# Patient Record
Sex: MALE | Race: WHITE
[De-identification: names, ages, dates, MRNs, and addresses within clinical notes are randomized per-mention and may not be internally consistent; named-entity substitution may affect disease eponyms.]

---

## 2019-07-26 ENCOUNTER — HOSPITAL ENCOUNTER (INPATIENT)
Dept: HOSPITAL 1 - ED | Age: 29
LOS: 2 days | Discharge: HOME | DRG: 755 | End: 2019-07-28
Attending: FAMILY MEDICINE | Admitting: FAMILY MEDICINE
Payer: MEDICAID

## 2019-07-26 VITALS
WEIGHT: 155.06 LBS | HEIGHT: 73 IN | WEIGHT: 155.06 LBS | BODY MASS INDEX: 20.55 KG/M2 | BODY MASS INDEX: 20.55 KG/M2 | HEIGHT: 73 IN

## 2019-07-26 DIAGNOSIS — F41.8: ICD-10-CM

## 2019-07-26 DIAGNOSIS — F84.0: ICD-10-CM

## 2019-07-26 DIAGNOSIS — R45.851: ICD-10-CM

## 2019-07-26 DIAGNOSIS — F12.10: ICD-10-CM

## 2019-07-26 DIAGNOSIS — F25.0: ICD-10-CM

## 2019-07-26 DIAGNOSIS — F43.10: Primary | ICD-10-CM

## 2019-07-26 DIAGNOSIS — Z91.5: ICD-10-CM

## 2019-07-26 DIAGNOSIS — F15.10: ICD-10-CM

## 2019-07-26 LAB
ALBUMIN SERPL-MCNC: 4 G/DL (ref 3.4–5)
ALP SERPL-CCNC: 50 U/L (ref 46–116)
ALT SERPL-CCNC: 16 U/L (ref 16–63)
AMPHETAMINES UR QL SCN: (no result)
AST SERPL-CCNC: 11 U/L (ref 15–37)
BASOPHILS NFR BLD: 0.4 % (ref 0–2)
BILIRUB SERPL-MCNC: 0.79 MG/DL (ref 0.2–1)
BUN SERPL-MCNC: 16 MG/DL (ref 7–18)
CALCIUM SERPL-MCNC: 8.6 MG/DL (ref 8.5–10.1)
CHLORIDE SERPL-SCNC: 107 MMOL/L (ref 98–107)
CO2 SERPL-SCNC: 21.8 MMOL/L (ref 21–32)
CREAT SERPL-MCNC: 0.8 MG/DL (ref 0.7–1.3)
ERYTHROCYTE [DISTWIDTH] IN BLOOD BY AUTOMATED COUNT: 14 % (ref 11.5–14.5)
GFR SERPLBLD BASED ON 1.73 SQ M-ARVRAT: > 60 ML/MIN
GLUCOSE SERPL-MCNC: 101 MG/DL (ref 74–106)
PLATELET # BLD: 257 X10^3MCL (ref 130–400)
POTASSIUM SERPL-SCNC: 4 MMOL/L (ref 3.5–5.1)
PROT SERPL-MCNC: 7.2 G/DL (ref 6.4–8.2)
SODIUM SERPL-SCNC: 142 MMOL/L (ref 136–145)

## 2019-07-26 PROCEDURE — G0378 HOSPITAL OBSERVATION PER HR: HCPCS

## 2019-07-26 PROCEDURE — G0480 DRUG TEST DEF 1-7 CLASSES: HCPCS

## 2019-07-26 NOTE — NUR
PT COMPLAINS OF DYSURIA, DR TABARES NOTIFIED. PT IN BATHROOM TO COLLECT URINE
SPECIMEN. WAIITTJ FOR ORDERS.

## 2019-07-26 NOTE — NUR
PT COMES TO ER BECAUSE HE IS FEELING SUICIDAL AFTER WAKING UP TODAY, HAS NO
SPECIFIC PLAN. PT REPORTS FEELING DEPRESSED LATELY, BECAUSE HIS FATHER  TWO
YEARS AGO. PT AWAKE, ALERT,ORIENTED X3, DENIES ANY PAIN AT THIS TIME, BUT FEELS
ANXIOUS, REQUESTING ATIVAN AND FOOD. RESP EVEN AND UNLABORED,ON RA@99%, DENIES
ANY CP OR SOB. DENIES ANY N/V/F, DOES REPORT SELF MEDICATING WITH MARIJUANA.
SAFETY PRECAUTIONS IN PLACE. CN AWARE OF PT'S STATUS.O SITTER AVAIABLE AT THIS
TIME.PT IN DIRECT NURSING VIEW. WILL CONT TO MONITOR CLOSELY.

## 2019-07-26 NOTE — NUR
PT REPORTS PAIN IMPROVED TO 2/10 S/P TYLENOL ADMINISTRATION. PT IS AWAKE AND
ALERT, RESP E/U. PT PROVIDED WITH JELLO AND CRACKERS PER HIS REQUEST. PT
SITTING UP IN HIGH FOWLERS. PT REMAINS CALM AND COOPERATIVE. ASKED PT IF HE
CURRENTLY HAD ANY SI, STATES "YES." ASKED HIM IF HE HAD A PLAN, PT STATED "I
JUST DON'T WANT TO THINK ABOUT IT."

## 2019-07-26 NOTE — NUR
SPOKE TO PSYCHIATRIST DR. CASEY. 
 AND IS REQUESTING HOSPITILAZATION FOR A 5150 HOLD. ALSO STATES THAT PT IS
WILLING TO GO VOLUNTARILY. CN NOTIFIED, WILL CALL [facility] TO COME AND PUT PT
ON HOLD.

## 2019-07-26 NOTE — NUR
PT LAYING COMFORTABLY IN GURNEY, AWAKE AND ALERT, CALM AND COOPERATIVE, RESP
E/U, REPORTS 10/10 HEADACHE PAIN. MD MADE AWARE. PT CONTINUES TO BE IN DIRECT
SIGHT OF NURSE'S STATION.

## 2019-07-26 NOTE — NUR
PATIENT IS BACK IN BED, HE HAD GONE TO THE BATHROOM. NO COMPLAINT OF PAIN AT
THIS TIME. PATIENT REQUESTED A BLANKET IF THE AMBULANCE IS NOT COMING FOR HIM 
NON. PATIENT WAS GIVEN BLANKET ANC COMFORT ENSURES. PATIENT IS WATCHING TV.

## 2019-07-26 NOTE — NUR
CALLED OVER TO PT ROOM, PT STATED HE HAD TOLD THE PREVIOUS NURSE THAT HE WAS
HAVING A HEADACHE. PT ALSO INFORMED ME THAT IN ORDER TO HELP WITH HIS HEADACHE,
HE WENT TO THE RESTROOM AND "BANGED MY HEAD AGAINST THE WALL AND I DON'T WANT
TO HURT MY SELF ANYMORE". EXPLAINED TO THE PT THAT HE NEEDS TO CALL FOR
ASSISTANCE WHEN HE NEEDS TO USE THE RESTROOM AND THAT HE CANNOT INJURE HIMSELF.
PT GAVE UNDERSTANDING AND AGREED THAT HE WOULD NO LONGER DO ANYTHING TO HURT
HIMSELF WHILE IN THE ED. INFORMED PT THAT TYLENOL HAD BEEN ORDER FOR HIM. THEN
ASKED PT IF HE HAD BEEN PROVIDED WITH DINNER, PT STATED HE DID NOT GET A DINNER
TRAY AND OFFERED HIM SANDWICH. PT AGREED TO SANDWICH AND TYLENOL FOR HEADACHE.
PT A&OX4 AT THIS TIME, STUTTERS IN HIS SPEECH, REMAINS COOPERATIVE AT THIS
TIME, BUT APPEARS UNEASY AND REPOSITIONG OFTEN TO COMFORT. EDUCATED PT TO USE
CALL LIGHT IF HE NEEDED TO USE RESTROOM. PT GAVE UNDERSTANDING.

## 2019-07-26 NOTE — NUR
I WAS CALLED TO THE ED TO WRITE A 5150 HOLD FOR THIS PATIENT.
I WAS TOLD BY THE [facility] THAT THE TELEPSYCH MD HAS SUGGESTED THE HOLD AFTER 
HIS EVALUATION.  THE RN NOTES ALSO STATE SUICIDAT IDEATION, NO PLAN, 5150
ADVISED.
I SPOKE TO THE PTIENT AT THE [facility].  HE TELLS ME HE LIVES AT [address]ESS[address]. 
HIS UNKLE (TRACIE DEL ROSARIO) AND HIS MOTHER, LOVAL TO [facility] [place].
 
HE STATES HE HAS BEEN ON MANY 5150 HOLDS AND UNDERSTANDS THEM.

## 2019-07-27 VITALS — DIASTOLIC BLOOD PRESSURE: 89 MMHG | SYSTOLIC BLOOD PRESSURE: 133 MMHG

## 2019-07-27 LAB
AMYLASE SERPL-CCNC: 56 U/L (ref 25–115)
CHOLEST SERPL-MCNC: 177 MG/DL (ref ?–200)
CHOLEST/HDLC SERPL: 2.9 MG/DL
HDLC SERPL-MCNC: 61 MG/DL (ref 40–60)
MAGNESIUM SERPL-MCNC: 2 MG/DL (ref 1.8–2.4)
TRIGL SERPL-MCNC: 134 MG/DL (ref ?–150)

## 2019-07-27 NOTE — NUR
CALLED TO PT RM, PT STS HE IS FEELING ANXIOUS AND WOULD LIKE SOME MEDICATION
FOR THAT. DR. RICHARDS MADE AWARE. PT CALM/COOPERATIVE, RESP E/U, NO ACUTE DISTRESS
NOTED AT THIS TIME.

## 2019-07-27 NOTE — NUR
PT REQUESTED TOOTH BRUSH AND TOOTH PASTE. PT GIVEN ITEMS AND TOLD HE HAS TO
BRUSH TEETH IN ROOM WITHIN VIEW OF NURSES STATION. PT WAS COOPERATIVE.

## 2019-07-27 NOTE — NUR
PT RESTING IN BED. BREATHING E/U. NO S/S ACUTE DISTRESS. 1:1 SITTER AT BEDSIDE
TO MAINTAIN SAFETY. CALL LIGHT WITHIN REACH. WILL CONTINUE TO MONITOR.

## 2019-07-27 NOTE — NUR
PT UP ASKING NURSES FOR PENCIL AND PAPER. PT WAS TOLD BY ME WE COULD NOT GIVE
HIM A PENCIL TO USE BUT I WILL WRITE SOMETHING DOWN FOR HIM IF HE NEEDED. PT
LAUGHED AND STATED "IT'S OK I DON'T WANT TO OFFEND YOU WITH MY RAP LYRICS". PT
CALM/COOPERATIVE, RESP E/U, AAOX4, NO ACUTE DISTRESS NOTED AT THIS TIME.

## 2019-07-27 NOTE — NUR
RECEIVED PT FROM ED VIA KAI. ORIENTED PT TO ROOM AND SURROUNDINGS. IV
NOTED TO LAC PATENT AND INTACT. INSTRUCTED PT ON THE USE OF CALL LIGHT FOR
ASSISTANCE. ENDORSED PT TO PRIMARY GIANA GALLOWAY

## 2019-07-27 NOTE — NUR
PT WALKED UP TO NURSES STATION AND STATED "I HAVE INFLAMMATION ON MY FACE RIGHT
HERE AND THEY DON'T WANT TO GIVE ME NAPROXEN FOR THE INFLAMMATION AND THAT'S
FUCKED UP. CAN I GET A PAPER FOR COMPLAINT, BECAUSE I'M GOING TO WRITE A
COMPLAINT. I'VE GOTTEN DOCTORS FIRED BEFORE AND THEY'RE NOT GOING TO GIVE ME
NAPROXEN THEN I'M GOING TO COMPLAIN". INFORMED PT THAT WE WOULD TALK TO THE
DOCTOR. THEN PT RETURNED TO ROOM AND BEGAN CLOSING THE CURTAINS, DESPITE MYSELF
AND RN DILIP TELLING THE PATIENT THAT CURTAIN NEEDS TO REMAIN OPEN.  UPON
ENTERING ROOM, INFORMED PT THAT THE CURTAIN MUST STAY OPEN, THEN PT STATED "
I'LL BE COOPERATIVE IF YOU GET ME NAPROXEN".  EXPLAINED TO PT THAT WE ARE NOT
BARGAINING EXPECTED BEHAVIOR FOR MEDICATIONS, THEN PT REPLIED "I'M NOT
BARGAINING! THAT'S WHAT YOU'RE SAYING." EXPLAINED TO PT THAT I WILL TALK TO THE
DOCTOR AND THEN HE STATED "IF I'M NOT GETTING NAPROXEN THEN I'M WALKING OUT OF
HERE AND THE POLICE CAN JEFFERY ME." THEN PT PROCEEDED TO WALK OUT TOWARDS EXIT
DOOR.  MILLS AT PT SIDE, DIRECTED PT TO RETURN TO [facility]. PT
AMBULATED BACK TO [facility].

## 2019-07-27 NOTE — NUR
REPORT RECEIVED FROM DELBERT CHERRY TO ASSUME CARE OF PT. PT AAOX4, CALM/COOPERATIVE,
RESP E/U, AMBULATED TO RESTROOM WITH STEADY GAIT, NO ACUTE DISTRESS NOTED AT
THIS TIME.

## 2019-07-27 NOTE — NUR
PT REEVALUATED FOR SI. PT STS HE NO LONGER WANTS TO HARM HIMSELF. PT STATED "I
WANT TO GET HELP MANAGING MY EMOTIONS". PT AAOX4, RESP E/U, CALM/COOPERATIVE,
NO ACUTE DISTRESS NOTED AT THIS TIME. PT WITH IN VIEW OF NURSES STATION.

## 2019-07-27 NOTE — NUR
AT PT BEDSIDE. PT REQUESTED MEDICATION AGAIN FOR INFLAMMATION. 
PT IS CALM AND COOPERATIVE AT THIS TIME, APOLOGETIC AND STATING HE NORMALLY
TAKES NAPROXEN AND WOULD APPRECIATE IF HE GOT THE MEDICATION. AWAITING FURTHER
ORDERS. 
PT CONTINUED TO APOLOGIZE FOR HIS BEHAVIOR. INFORMED PT THAT I APPRECIATE THE
CLEARER COMMUNICATION AND THAT WE ARE TRYING TO HELP HIM AND WORK WITH HIM. PT
GAVE UNDERSTANDING AND RETURNED TO POSITION OF COMFORT.

## 2019-07-27 NOTE — NUR
PATIENT AMBULATED TO THE BATHROOM. REQUESTED SANDWICH AND JUICE ON RETURN TO
BED. SANDWICH AND JUICE GIVEN.

## 2019-07-27 NOTE — NUR
PT AMBULATED TO RESTROOM WITH STEADY GAIT. PT STATED THE FLOOR IS TOO SLIPPERY
FOR HIM. PT GIVEN NON-SLIP SOCKS TO USE.

## 2019-07-28 VITALS — SYSTOLIC BLOOD PRESSURE: 128 MMHG | DIASTOLIC BLOOD PRESSURE: 78 MMHG

## 2019-07-28 VITALS — SYSTOLIC BLOOD PRESSURE: 119 MMHG | DIASTOLIC BLOOD PRESSURE: 69 MMHG

## 2019-07-28 VITALS — DIASTOLIC BLOOD PRESSURE: 51 MMHG | SYSTOLIC BLOOD PRESSURE: 94 MMHG

## 2019-07-28 VITALS — DIASTOLIC BLOOD PRESSURE: 69 MMHG | SYSTOLIC BLOOD PRESSURE: 119 MMHG

## 2019-07-28 NOTE — NUR
POLICE AND STAFF MEMBERS MET PT IN PARKING LOT. PT AGREED TO RETURN TO UNIT
AFTER SPEAKING TO POLICE AND STAFF. PT RETURNED TO ROOM WITH 1:1 SITTER. CALM
AND COOPERATIVE WITH CARE AT THIS TIME. WILL CONTINUE TO MONITOR.

## 2019-07-28 NOTE — NUR
PT C/O "10/10 HEADACHE PAIN AND 7/10 BODY PAIN", NO PRN PAIN MEDS AVAILABLE AT
THIS TIME, PAGED DR. LEGER. WAITING FOR CALLBACK.

## 2019-07-28 NOTE — NUR
PT TOLERATED TREATMENT WELL DURING THE SHIFT, STILL REFUSING IV ACCESS, PACED
WITH NURSE AND CNA AROUND THE UNIT DURING THE DAY, NO NEW EVENTS TO REPORTS,
WILL REPORT TO NIGHT SHIFT NURSE AND EDORSE CARE

## 2019-07-28 NOTE — NUR
PT CLEARED TO GO HOME. PT GIVEN DISCHARGE PACKET, DISCHARGE TEACHING PROVIDED,
INSTRUCTED TO FOLLOW UP WITH PCP, PT VERBALIZED UNDERSTANDING. ALL BELONGINGS
WITH PT. VSS. NO S/S ACUTE DISTRESS. PT DISCHARGED HOME.

## 2019-07-28 NOTE — NUR
PT STATED HE WANTED TO LEAVE AMA, INFORMED PT THAT HE IS ON A HOLD. PT REPORTS
FEELING ANXIOUS. CURRENTLY AMBULATING AROUND UNIT WITH SITTER.

## 2023-02-18 NOTE — NUR
Contacted the following facilities regarding placement:
 
[facility]: s/w Nadia, no beds available at this time. Faxed for
waitlist.
[facility]: s/w Luís, no beds available at this time.
[facility]: Can only admit medi-obey pts when they are under 19 y/o.
[facility]: s/w Jami, states no beds or projected openings, try again
tomorrow.
[facility]: s/w Nancy, states no beds available.
[facility]: s/w Samantha, states open beds, packet faxed for review.
[facility]: s/w Radha, states no beds.
[facility]: No answer, left voice message.
 
 
Will continue to look for placement. Will contact with any updates. No
